# Patient Record
Sex: MALE | Race: AMERICAN INDIAN OR ALASKA NATIVE
[De-identification: names, ages, dates, MRNs, and addresses within clinical notes are randomized per-mention and may not be internally consistent; named-entity substitution may affect disease eponyms.]

---

## 2017-10-22 NOTE — EMERGENCY DEPARTMENT REPORT
ED Chest Pain HPI





- General


Chief Complaint: Extremity Injury, Upper


Stated Complaint: RIGHT ARM PAIN


Time Seen by Provider: 10/22/17 14:36


Source: patient


Mode of arrival: Ambulatory


Limitations: No Limitations





- History of Present Illness


Initial Comments: 





Patient here reports that he has pain in his right shoulder, numbness in right 

arm and hand.  Pain when he tilts his head and neck forward and downward.  

States pain is in his right side of his chest when he lays on his right side.  

Onset of symptoms has been going for weeks.  Did not denies any vomiting but 

reports nausea.  Denies any history of any medical problems include heart 

disease, stroke, high blood pressure diabetes.  Denies any fever or chills.  

Denies any dizziness or blurred vision.  Patient said the pain is located on 

the right side of his chest and neck and it is 5 out of 10 and feels achy but 

only with movement.


MD Complaint: chest pain, other (shoulder pain)


-: week(s)


Onset: during rest


Pain Location: right chest, other (rt neck and shoulder)


Quality: aching


Consistency: intermittent


Improves With: rest


Worsens With: movement


Context: other (unknown)


re: nausea.  denies: vomting, diaphoresis, dyspnea, other


Other Symptoms: denies: cough, fever, syncope, rash, acid taste in mouth, leg 

swelling, palpitations, burping


Treatments Prior to Arrival: none


Aspirin use within the Past 7 Days: (0) No





- Related Data


On Oral Contraceptives: No


 Previous Rx's











 Medication  Instructions  Recorded  Last Taken  Type


 


Ibuprofen [Motrin] 600 mg PO Q8H PRN #15 tablet 10/22/17 Unknown Rx











 Allergies











Allergy/AdvReac Type Severity Reaction Status Date / Time


 


No Known Allergies Allergy   Unverified 10/22/17 11:48














Heart Score





- HEART Score


History: Slightly suspicious


EKG: Normal


Age: 45-65


Risk factors: No known risk factors


Troponin: < normal limit


HEART Score: 1





- Critical Actions


Critical Actions: 0-3 pts:0.9-1.7%risk of adverse cardiac event.Candidate for 

discharge





ED Review of Systems


ROS: 


Stated complaint: RIGHT ARM PAIN


Other details as noted in HPI





Comment: All other systems reviewed and negative


Constitutional: no symptoms reported


ENT: denies: ear pain, throat pain, congestion


Respiratory: no symptoms reported


Cardiovascular: chest pain.  denies: palpitations, dyspnea on exertion, edema, 

syncope, paroxysmal nocturnal dyspnea


Gastrointestinal: nausea.  denies: abdominal pain, vomiting, diarrhea, 

constipation


Genitourinary: denies: urgency, dysuria, frequency, hematuria


Musculoskeletal: arthralgia.  denies: back pain, joint swelling, myalgia


Skin: denies: rash


Neurological: numbness (right arm).  denies: headache, weakness, paresthesias, 

confusion, abnormal gait, vertigo





ED Past Medical Hx





- Past Medical History


Previous Medical History?: No





- Surgical History


Past Surgical History?: No





- Family History


Family history: no significant





- Social History


Smoking Status: Never Smoker


Substance Use Type: Alcohol





- Medications


Home Medications: 


 Home Medications











 Medication  Instructions  Recorded  Confirmed  Last Taken  Type


 


Ibuprofen [Motrin] 600 mg PO Q8H PRN #15 tablet 10/22/17  Unknown Rx














ED Physical Exam





- General


Limitations: No Limitations


General appearance: alert, in no apparent distress





- Head


Head exam: Present: atraumatic, normocephalic, normal inspection





- Eye


Eye exam: Present: normal appearance, PERRL, EOMI


Pupils: Present: normal accommodation





- ENT


ENT exam: Present: normal exam, normal orophraynx, mucous membranes moist





- Neck


Neck exam: Present: normal inspection, full ROM, other (no C-spine tenderness).

  Absent: tenderness, meningismus, lymphadenopathy, thyromegaly





- Respiratory


Respiratory exam: Present: normal lung sounds bilaterally.  Absent: respiratory 

distress, wheezes, rales, rhonchi, stridor, chest wall tenderness, accessory 

muscle use, decreased breath sounds





- Cardiovascular


Cardiovascular Exam: Present: regular rate, normal rhythm, normal heart sounds.

  Absent: systolic murmur, diastolic murmur





- GI/Abdominal


GI/Abdominal exam: Present: soft, normal bowel sounds.  Absent: distended, 

tenderness, guarding, rebound, rigid





- Extremities Exam


Extremities exam: Present: normal inspection, full ROM, normal capillary refill

, other (+2 pulses all extremities.  +5/5 strength all extremities.  Patient 

with active range of motion to all extremities.  No joint deformity, effusion 

or crepitus.  No swelling or erythema noted to extremity.  No abrasion, 

contusion or laceration to extremities.  No neurovascular compromise.  No 

clubbing, cyanosis or edema to extremities).  Absent: tenderness, pedal edema, 

joint swelling, calf tenderness





- Back Exam


Back exam: Present: normal inspection, full ROM, other (Chin able to ambulate 

without any difficulties).  Absent: tenderness, CVA tenderness (R), CVA 

tenderness (L), muscle spasm, paraspinal tenderness, vertebral tenderness, rash 

noted





- Neurological Exam


Neurological exam: Present: alert, oriented X3, normal gait, reflexes normal, 

other (no gross neurological).  Absent: motor sensory deficit





- Psychiatric


Psychiatric exam: Present: normal affect, normal mood





- Skin


Skin exam: Present: warm, dry, intact, normal color.  Absent: rash





ED Course


 Vital Signs











  10/22/17





  11:48


 


Temperature 98.3 F


 


Pulse Rate 80


 


Respiratory 20





Rate 


 


Blood Pressure 119/84


 


O2 Sat by Pulse 100





Oximetry 














- Reevaluation(s)


Reevaluation #1: 





10/22/17 16:27


Patient stable in no  acute distress.





ALEJANDRO score





- Alejandro Score


Age > 65: (0) No


Aspirin use within the Past 7 Days: (0) No


3 or more CAD Risk Factors: (0) No


2 or more Angina events in past 24 hrs: (0) No


Known CAD with more than 50% Stenosis: (0) No


Elevated Cardiac Markers: (0) No


ST Deviation Greater than 0.5mm: (0) No


ALEJANDRO Score: 0





ED Medical Decision Making





- Lab Data


Result diagrams: 


 10/22/17 14:49





 10/22/17 14:49








 Lab Results











  10/22/17 10/22/17 Range/Units





  14:49 14:49 


 


WBC  5.6   (4.5-11.0)  K/mm3


 


RBC  5.24 H   (3.65-5.03)  M/mm3


 


Hgb  16.1 H   (11.8-15.2)  gm/dl


 


Hct  48.6 H   (35.5-45.6)  %


 


MCV  93   (84-94)  fl


 


MCH  31   (28-32)  pg


 


MCHC  33   (32-34)  %


 


RDW  13.6   (13.2-15.2)  %


 


Plt Count  239   (140-440)  K/mm3


 


Lymph % (Auto)  30.2   (13.4-35.0)  %


 


Mono % (Auto)  6.0   (0.0-7.3)  %


 


Eos % (Auto)  2.0   (0.0-4.3)  %


 


Baso % (Auto)  0.7   (0.0-1.8)  %


 


Lymph #  1.7   (1.2-5.4)  K/mm3


 


Mono #  0.3   (0.0-0.8)  K/mm3


 


Eos #  0.1   (0.0-0.4)  K/mm3


 


Baso #  0.0   (0.0-0.1)  K/mm3


 


Seg Neutrophils %  61.1   (40.0-70.0)  %


 


Seg Neutrophils #  3.4   (1.8-7.7)  K/mm3


 


Sodium   135 L  (137-145)  mmol/L


 


Potassium   5.0  (3.6-5.0)  mmol/L


 


Chloride   98.4  ()  mmol/L


 


Carbon Dioxide   26  (22-30)  mmol/L


 


Anion Gap   16  mmol/L


 


BUN   8 L  (9-20)  mg/dL


 


Creatinine   1.0  (0.8-1.5)  mg/dL


 


Estimated GFR   > 60  ml/min


 


BUN/Creatinine Ratio   8  %


 


Glucose   99  ()  mg/dL


 


Calcium   8.9  (8.4-10.2)  mg/dL


 


Troponin T   < 0.010  (0.00-0.029)  ng/mL














- EKG Data


-: EKG Interpreted by Me (by attending physician)


EKG shows normal: sinus rhythm (73 bpm)


Rate: normal





- EKG Data


Interpretation: no acute changes





- Medical Decision Making





ED course: Patient here complaining of right shoulder pain with numbness to his 

right arm and hand that's been ongoing for weeks.  He is also complaining that 

pain to right chest with certain movement.  Reports episodic nausea.  Physical 

findings reveal normal exam without any joint abnormality.  EKG sinus rhythm 

without any abnormal findings, troponin normal, CBC and BMP is stable.  I 

discussed the patient that his sodium is mildly low and he needs to drink some 

Gatorade and also his CBC shows hemoconcentration which means that he might be 

dehydrated so he needs to increase his fluid intake overall.  His 

understanding.  Full discussed with him that all his cardiac labs and EKG was 

within normal limit but these are screening tests and he will need to follow up 

with cardiologist for evaluation of right chest pain and also follow up with 

his primary care physician for evaluation and orthopedic doctor for shoulder 

pain.  Patient was understanding of discharge instruction and discharged home 

in stable condition with prescription for Motrin.


Critical care attestation.: 


If time is entered above; I have spent that time in minutes in the direct care 

of this critically ill patient, excluding procedure time.








ED Disposition


Clinical Impression: 


 Atypical chest pain, Arthralgia of right shoulder region, Paresthesia of right 

upper extremity





Disposition: DC-01 TO HOME OR SELFCARE


Is pt being admited?: No


Does the pt Need Aspirin: No


Condition: Stable


Instructions:  Chest Pain (ED), Arthralgia (ED), Paresthesia (ED)


Additional Instructions: 


Please follow up with her primary care physician in 2-3 days.  If you do not 

have a primary care physician, he follow-up at [facility].  

Please refer to discharge instruction paperwork for details on phone number and 

address


Please follow up with cardiologist for chest pain.


If you continue to have shoulder pain, you need to follow up with orthopedic 

doctor


Prescriptions: 


Ibuprofen [Motrin] 600 mg PO Q8H PRN #15 tablet


 PRN Reason: Pain


Referrals: 


PRIMARY CARE,MD [Primary Care Provider] - 2-3 Days


VILLA THOMSON MD [Staff Physician] - 10/24/17


SELIN AGUIRRE MD [Staff Physician] - 2-3 Days


Forms:  Work/School Release Form(ED)

## 2018-05-16 NOTE — EMERGENCY DEPARTMENT REPORT
Abscess Boil HPI





- HPI


Chief Complaint: Skin/Abscess/Foreign Body


Stated Complaint: LARGE BUMP ON BEHIND


Time Seen by Provider: 05/16/18 16:45


Duration: >1 Week


Location: Other (buttock, right side)


Severity: Mild


History: Yes Pain, No Fever, No Purulent Drainage, No Numbness, No Foreign Body

, No Previous History, No Insect Bite


HPI: This is a 53-year-old male nontoxic, well nourished in appearance, no 

acute signs of distress presents to the ED with c/o of right buttock boil 1 

week.  Patient denies any posterior drainage.  Patient denies any fever, chills

, nausea, vomiting, chest pain, shortness of breath, numbness, tingling, 

abdominal pain, back pain.  Patient denies any allergies or significant past 

medical history.


Home Medications: 


 Previous Rx's











 Medication  Instructions  Recorded  Last Taken  Type


 


Ibuprofen [Motrin] 600 mg PO Q8H PRN #15 tablet 10/22/17 Unknown Rx


 


Ibuprofen [Motrin] 600 mg PO Q8H PRN #30 tablet 05/16/18 Unknown Rx


 


Sulfamethoxazole/Trimethoprim 1 each PO BID #14 tablet 05/16/18 Unknown Rx





[Bactrim DS TAB]    


 


traMADol [Ultram] 50 mg PO Q6HR PRN #12 tablet 05/16/18 Unknown Rx











Allergies/Adverse Reactions: 


 Allergies











Allergy/AdvReac Type Severity Reaction Status Date / Time


 


No Known Allergies Allergy   Unverified 10/22/17 11:48














ED Review of Systems


ROS: 


Stated complaint: LARGE BUMP ON BEHIND


Other details as noted in HPI





Constitutional: denies: chills, fever


Eyes: denies: eye pain, eye discharge, vision change


ENT: denies: ear pain, throat pain


Respiratory: denies: cough, shortness of breath, wheezing


Cardiovascular: denies: chest pain, palpitations


Endocrine: no symptoms reported


Gastrointestinal: denies: abdominal pain, nausea, diarrhea


Genitourinary: denies: urgency, dysuria


Musculoskeletal: denies: back pain, joint swelling, arthralgia


Skin: denies: rash, lesions


Neurological: denies: headache, weakness, paresthesias


Psychiatric: denies: anxiety, depression


Hematological/Lymphatic: denies: easy bleeding, easy bruising





ED Past Medical Hx





- Past Medical History


Previous Medical History?: No





- Surgical History


Past Surgical History?: No





- Social History


Smoking Status: Never Smoker


Substance Use Type: Alcohol





- Medications


Home Medications: 


 Home Medications











 Medication  Instructions  Recorded  Confirmed  Last Taken  Type


 


Ibuprofen [Motrin] 600 mg PO Q8H PRN #15 tablet 10/22/17  Unknown Rx


 


Ibuprofen [Motrin] 600 mg PO Q8H PRN #30 tablet 05/16/18  Unknown Rx


 


Sulfamethoxazole/Trimethoprim 1 each PO BID #14 tablet 05/16/18  Unknown Rx





[Bactrim DS TAB]     


 


traMADol [Ultram] 50 mg PO Q6HR PRN #12 tablet 05/16/18  Unknown Rx














ED Abscess Boil Physical Exam





- Exam


General: 


Vital signs noted. No distress. Alert and acting appropriately.





GENERAL: The patient is a well-developed, well-nourished in no apparent 

distress. Patient is alert and acting appropriately for age.  Alert and 

oriented 3, no apparent distress, normal gait, atraumatic.





HEENT: Head is normocephalic and atraumatic. PERRL, Extraocular muscles are 

intact. Pupils are equal, round, and reactive to light and accommodation. Nares 

appeared normal. Mouth is well hydrated and without lesions. Mucous membranes 

are moist. Posterior pharynx clear of any exudate or lesions.  Mouth is well 

hydrated and without lesions.  Tonsils not erythematous or swollen.  Uvula 

midline.  Tongue elevated.  Mucous members are moist.  Posterior pharynx clear, 

no exudate or lesions.  Patent airways.


 


NECK: Supple. No carotid bruits. No lymphadenopathy or thyromegaly.nontender. 

No meningitic signs are noted.


 


LUNGS: Clear to auscultation. Non labor breathing. No intercostal retractions.  

Symmetrical with respiration, no wheezing, no rales, or crackles.


 


HEART: Regular rate and rhythm without murmur, rubs or gallops. No 

reproducible.  S1, S2 present, regular rate and rhythm without murmur, no rubs, 

no gallops.


 


ABDOMEN: Soft, nontender, and nondistended.  Positive bowel sounds.  No 

hepatosplenomegaly was noted. No guarding or rebound tenderness, negative 

epigastric bruit. Negative psoas sign, negative mayes sign, negative McBurneys 

sign


 


EXTREMITIES: Without any cyanosis, clubbing, rash, lesions or edema. Peripheral 

pulses intact. Capillary refill less than 2 seconds.  Full range of motion 

bilaterally.


 


NEUROLOGIC: Cranial nerves II through XII are grossly intact. Alert and 

oriented x 3. Normal gait. Symmetrical strength and sensation. Reflexes 2+ 

throughout. Cerebellar testing normal. GCS score of 15.


 


PSYCHIATRIC: Normal affect with no suicidal or homicidal ideations.





Skin: One centimeter abscess to left buttock.  Tender to touch.  Positive 

induration or fluctuance.  No pus or drainage. No perianal involvement.


Front/Back of Body, Lg (Color): 


  __________________________














  __________________________





 1 - abscess





Size: 1 cm


Exam: Yes Normal Neurologic Exam, Yes Normal Circulation, No Tenderness, No 

Fluctuance, No Surrounding Cellulites/Erythema, No Lymphangitis, No Crepitation

, No Heart Murmur





I & D Note





- I & D Note


I & D Note: Under sterile field, I used Betadine to cleanse the area.  I then 

used 2% lidocaine with epi 1-200,000 with 25-gauge 5/8 needle to inject area 

for anesthetic purposes.  Total volume injected 3 mL.  I then used an 11 blade 

to make a 1 cm incision.  About 2 mL's of purulent drainage has been noted.  I 

then used a hemostat to break the abscess formation.  I then used sterile 0.9% 

normal saline flush to flush the wound with total volume of 40 mL used.  I then 

put a 1/4 iodoform packing to the incision.  A sterile 4 x 4 with tape has been 

applied as dressing.  Bleeding is under control.  Patient tolerated the 

procedure well with no signs of distress noted.





ED Course


 Vital Signs











  05/16/18





  14:39


 


Temperature 98.6 F


 


Pulse Rate 94 H


 


Respiratory 18





Rate 


 


Blood Pressure 120/80


 


O2 Sat by Pulse 99





Oximetry 














- Reevaluation(s)


Reevaluation #1: 





05/16/18 16:51


Patient is speaking in full sentences with no signs of distress noted.


Critical care attestation.: 


If time is entered above; I have spent that time in minutes in the direct care 

of this critically ill patient, excluding procedure time.








ED Medical Decision Making





- Medical Decision Making





This is a 53-year-old female that presents with left buttock abscess.  Patient 

is stable and was examined by me.  This is incision and drainage and has been 

performed and patient tolerated well.  A sterile dressing has been applied.  

Patient was educated on proper wound care.  Patient is discharged with Bactrim 

and Ultram and was instructed not to operate any machinery while taking Ultram 

due to drowsiness.  Patient was instructed to return in 2 days for packing 

removal.  Patient was instructed to refer to Follow-up with a primary care 

doctor in 3-5 days or if symptoms worsen and continue return to emergency room 

as soon as possible.  At time of discharge, the patient does not seem toxic or 

ill in appearance.  No acute signs of distress noted.  Patient agrees to 

discharge treatment plan of care.  No further questions noted by the patient. 





ED Disposition


Clinical Impression: 


 Encounter for incision and drainage procedure, Abscess





Disposition: DC-01 TO HOME OR SELFCARE


Is pt being admited?: No


Does the pt Need Aspirin: No


Condition: Stable


Instructions:  Abscess Incision and Drainage (ED), Abscess (ED), 

Sulfamethoxazole/Trimethoprim (By mouth), Tramadol (By mouth)


Additional Instructions: 


Follow-up with a primary care doctor in 3-5 days or if symptoms worsen and 

continue return to emergency room as soon as possible. 


Return in 2 days for packing removal and reassessment of your abscess.


Prescriptions: 


Ibuprofen [Motrin] 600 mg PO Q8H PRN #30 tablet


 PRN Reason: Pain


Sulfamethoxazole/Trimethoprim [Bactrim DS TAB] 1 each PO BID #14 tablet


traMADol [Ultram] 50 mg PO Q6HR PRN #12 tablet


 PRN Reason: Pain


Referrals: 


PRIMARY CARE,MD [Referring] - 3-5 Days


CARLA CINTRON MD [Staff Physician] - 3-5 Days


[facility] [Outside] - 3-5 Days


[facility] [Outside] - 3-5 Days


Forms:  Work/School Release Form(ED)

## 2018-05-18 NOTE — EMERGENCY DEPARTMENT REPORT
ED Recheck HPI





- General


Chief Complaint: Laceration/Recheck/Suture


Stated Complaint: PACKING REMOVAL


Time Seen by Provider: 18 12:29


Source: patient


Mode of arrival: Ambulatory


Limitations: No Limitations





- History of Present Illness


Initial Comments: 


53-year-old male presents for packing removal from left buttock.  Patient had 

abscess incised and drained in the ED 2 days ago.  Denies fever or chills.  

States pain is improved.  Denies any difficulty defecating.


MD Complaint: wound re-check


Onset/Timin


-: days(s)


Initial Visit For: abscess


Associated Symptoms: none





- Related Data


 Previous Rx's











 Medication  Instructions  Recorded  Last Taken  Type


 


Ibuprofen [Motrin] 600 mg PO Q8H PRN #15 tablet 10/22/17 Unknown Rx


 


Ibuprofen [Motrin] 600 mg PO Q8H PRN #30 tablet 18 Unknown Rx


 


Sulfamethoxazole/Trimethoprim 1 each PO BID #14 tablet 18 Unknown Rx





[Bactrim DS TAB]    


 


traMADol [Ultram] 50 mg PO Q6HR PRN #12 tablet 18 Unknown Rx











 Allergies











Allergy/AdvReac Type Severity Reaction Status Date / Time


 


No Known Allergies Allergy   Unverified 10/22/17 11:48














ED Review of Systems


ROS: 


Stated complaint: PACKING REMOVAL


Other details as noted in HPI





Constitutional: denies: chills, fever


Eyes: denies: eye pain, eye discharge, vision change


ENT: denies: ear pain, throat pain


Respiratory: denies: cough, shortness of breath, wheezing


Cardiovascular: denies: chest pain, palpitations


Endocrine: no symptoms reported


Gastrointestinal: denies: abdominal pain, nausea, diarrhea


Genitourinary: denies: urgency, dysuria


Musculoskeletal: denies: back pain, joint swelling, arthralgia


Skin: denies: rash, lesions


Neurological: denies: headache, weakness, paresthesias


Psychiatric: denies: anxiety, depression


Hematological/Lymphatic: denies: easy bleeding, easy bruising





ED Past Medical Hx





- Past Medical History


Previous Medical History?: No





- Surgical History


Past Surgical History?: No





- Social History


Smoking Status: Never Smoker


Substance Use Type: None





- Medications


Home Medications: 


 Home Medications











 Medication  Instructions  Recorded  Confirmed  Last Taken  Type


 


Ibuprofen [Motrin] 600 mg PO Q8H PRN #15 tablet 10/22/17  Unknown Rx


 


Ibuprofen [Motrin] 600 mg PO Q8H PRN #30 tablet 18  Unknown Rx


 


Sulfamethoxazole/Trimethoprim 1 each PO BID #14 tablet 18  Unknown Rx





[Bactrim DS TAB]     


 


traMADol [Ultram] 50 mg PO Q6HR PRN #12 tablet 18  Unknown Rx














ED Physical Exam





- General


Limitations: No Limitations


General appearance: alert, in no apparent distress





- Head


Head exam: Present: atraumatic, normocephalic





- Eye


Eye exam: Present: normal appearance, PERRL, EOMI





- ENT


ENT exam: Present: normal exam, mucous membranes moist





- Neck


Neck exam: Present: normal inspection





- Respiratory


Respiratory exam: Present: normal lung sounds bilaterally.  Absent: respiratory 

distress





- Cardiovascular


Cardiovascular Exam: Present: regular rate, normal rhythm.  Absent: systolic 

murmur, diastolic murmur, rubs, gallop





- GI/Abdominal


GI/Abdominal exam: Present: soft, normal bowel sounds





- Rectal


Rectal exam: Present: deferred





- Extremities Exam


Extremities exam: Present: normal inspection





- Back Exam


Back exam: Present: normal inspection





- Neurological Exam


Neurological exam: Present: alert, oriented X3





- Psychiatric


Psychiatric exam: Present: normal affect, normal mood





- Skin


Skin exam: Present: warm, dry, intact, normal color.  Absent: rash





- Expanded Skin Exam


  ** Expanded





  __________________________














  __________________________





 1 - Abscess site here.  Iodoform gauze sticking out of wound








ED Course


 Vital Signs











  18





  11:35


 


Temperature 98.4 F


 


Pulse Rate 83


 


Respiratory 16





Rate 


 


Blood Pressure 136/84


 


O2 Sat by Pulse 98





Oximetry 














ED Recheck MDM





- Differential Diagnosis


Wound Recheck





- Medical Decision Making


A/P: Wound check


1-about 4 inches of 1/4 inch iodoform  gauze packing removed from abscess 

incision site. no extra packing placed 


2 appears to still be draining some pus however as per patient has decreased in 

size since it was initially drained


3-advised patient to continue taking his antibiotic course and finish it


4-patient advised to return to the ED for any fevers chills expansion of 

abscess or increased pain at the site. 


Critical care attestation.: 


If time is entered above; I have spent that time in minutes in the direct care 

of this critically ill patient, excluding procedure time.








ED Disposition


Clinical Impression: 


 Wound check, abscess, Abscess packing removal





Disposition:  TO HOME OR SELFCARE


Is pt being admited?: No


Does the pt Need Aspirin: No


Condition: Stable


Instructions:  Acute Wound Care (ED), Sitz Bath (GEN), Abscess (ED)


Referrals: 


[facility] [Provider Group] - 3-5 Days


Time of Disposition: 12:53

## 2019-04-23 ENCOUNTER — HOSPITAL ENCOUNTER (EMERGENCY)
Dept: HOSPITAL 5 - ED | Age: 54
Discharge: HOME | End: 2019-04-23
Payer: COMMERCIAL

## 2019-04-23 VITALS — DIASTOLIC BLOOD PRESSURE: 87 MMHG | SYSTOLIC BLOOD PRESSURE: 132 MMHG

## 2019-04-23 DIAGNOSIS — R20.8: ICD-10-CM

## 2019-04-23 DIAGNOSIS — G57.11: Primary | ICD-10-CM

## 2019-04-23 PROCEDURE — 99282 EMERGENCY DEPT VISIT SF MDM: CPT

## 2019-04-23 NOTE — EMERGENCY DEPARTMENT REPORT
HPI





- General


Chief Complaint: Skin Rash


Time Seen by Provider: 04/23/19 10:03





- HPI


HPI: 





54-year-old -American male presents to the emergency department with a 

complaint of some intermittent stinging/burning sensation as well as some 

numbness to the right thigh just above the knee.  This is been going on for the 

past 3 weeks and he says that it started after cutting some grass outside.  At 

first he says that the area was swollen.  He went to some type of a clinic and 

was placed on some type of cream which she says does not provide any relief.  

Swelling went down on its own.  He denies any skin color change, rash or 

lesions.  He denies any past medical history.  No recent travel or sick contacts

at home.





ED Past Medical Hx





- Social History


Smoking Status: Never Smoker


Substance Use Type: None





- Medications


Home Medications: 


                                Home Medications











 Medication  Instructions  Recorded  Confirmed  Last Taken  Type


 


Ibuprofen [Motrin] 600 mg PO Q8H PRN #15 tablet 10/22/17  Unknown Rx


 


Ibuprofen [Motrin] 600 mg PO Q8H PRN #30 tablet 05/16/18  Unknown Rx


 


Sulfamethoxazole/Trimethoprim 1 each PO BID #14 tablet 05/16/18  Unknown Rx





[Bactrim DS TAB]     


 


traMADol [Ultram] 50 mg PO Q6HR PRN #12 tablet 05/16/18  Unknown Rx














ED Review of Systems


ROS: 


Stated complaint: R LEG BURNING SENSATION


Other details as noted in HPI





Comment: All other systems reviewed and negative


Constitutional: denies: chills, fever


Eyes: denies: eye pain, vision change


ENT: denies: ear pain, throat pain


Respiratory: denies: cough, shortness of breath


Cardiovascular: denies: chest pain, palpitations


Gastrointestinal: denies: nausea, vomiting


Genitourinary: denies: dysuria, discharge


Musculoskeletal: myalgia.  denies: back pain


Skin: denies: rash, lesions


Neurological: numbness (left thigh).  denies: headache





Physical Exam





- Physical Exam


Vital Signs: 


                                   Vital Signs











  04/23/19





  08:11


 


Temperature 98.2 F


 


Pulse Rate 70


 


Respiratory 20





Rate 


 


Blood Pressure 132/87





[Right] 


 


O2 Sat by Pulse 99





Oximetry 











Physical Exam: 





GENERAL: The patient is well-developed well-nourished.


HEENT: Normocephalic.  Atraumatic.   Patient has moist mucous membranes.


EYES:  Extraocular motions are intact.  


NECK: Supple.  Trachea is midline.


CHEST/LUNGS: Clear to auscultation.  There is no respiratory distress noted.  


HEART/CARDIOVASCULAR: Regular.  There is no tachycardia.  There is no obvious 

murmur.


ABDOMEN:  There is no abdominal distention.


SKIN: Skin is warm and dry.  There is no erythema, rash, lesions seen to his 

area of complaint.


NEURO: The patient is awake, alert, and oriented.  The patient is cooperative.  

There is some mild subjective decreased sensation to the right distal lateral 

thigh.  The patient has normal speech.


MUSCULOSKELETAL: There is no tenderness or deformity.  There is no limitation 

range of motion.  There is no evidence of acute injury.  Muscle strength 5 out 

of 5 for bilateral lower extremities.





ED Course


                                   Vital Signs











  04/23/19





  08:11


 


Temperature 98.2 F


 


Pulse Rate 70


 


Respiratory 20





Rate 


 


Blood Pressure 132/87





[Right] 


 


O2 Sat by Pulse 99





Oximetry 














ED Medical Decision Making





- Medical Decision Making





Patient presents with a 3 week history of some intermittent numbness and burning

 sensation to the right lower thigh, just above the knee.  He says that 

previously he had some swelling but that has since resolved.  He also has no 

signs of any skin color change, rash or lesions to the area.  He has full range 

of motion and good muscle strength.  The patient was seen ambulatory without any

 instability.  There was no trauma and therefore I did not feel that any imaging

 was necessary.  Patient was given a dose of Decadron to empirically treat 

possible oncoming dermatitis, neuropathy.  He otherwise was given a referral for

 primary care and may need to see either dermatology or an orthopedist in the 

future.  Vital signs stable throughout his ED course.  He will return to the ER 

with any worsening of his symptoms or any acute distress.





- Differential Diagnosis


meralgia paresthetica, neuropathy, radiculopathy, dermatitis


Critical Care Time: No


Critical care attestation.: 


If time is entered above; I have spent that time in minutes in the direct care 

of this critically ill patient, excluding procedure time.








ED Disposition


Clinical Impression: 


 Meralgia paresthetica of right side, Burning sensation





Disposition: DC-01 TO HOME OR SELFCARE


Is pt being admited?: No


Condition: Stable


Instructions:  Meralgia Paresthetica (ED), Paresthesia (ED)


Additional Instructions: 


Please follow up with a primary care physician in the next few days.  Return to 

the emergency Department with any worsening of your symptoms or any acute 

distress.  Use ice for any further swelling of the thigh, but not directly 

against the skin.


Referrals: 


TESSA CALDERON MD [Staff Physician] - 3-5 Days


[facility] [Outside] - 3-5 Days


Forms:  Work/School Release Form(ED)


Time of Disposition: 10:23